# Patient Record
Sex: FEMALE | Race: WHITE
[De-identification: names, ages, dates, MRNs, and addresses within clinical notes are randomized per-mention and may not be internally consistent; named-entity substitution may affect disease eponyms.]

---

## 2017-11-13 ENCOUNTER — HOSPITAL ENCOUNTER (OUTPATIENT)
Dept: HOSPITAL 53 - M RAD | Age: 72
End: 2017-11-13
Attending: OTOLARYNGOLOGY
Payer: MEDICARE

## 2017-11-13 DIAGNOSIS — R49.0: Primary | ICD-10-CM

## 2017-11-13 NOTE — REP
Clinical:  Dysphonia .

 

Comparison: None .

 

Technique:  PA and lateral.

 

Findings:

The mediastinum and cardiac silhouette are normal.  The lung fields demonstrate

chronic-appearing interstitial changes without acute consolidation, effusion, or

pneumothorax.  The skeletal structures are intact and normal.

 

Impression:

1.   No acute cardiopulmonary process.

 

 

Signed by

Leon Alvares MD 11/13/2017 01:01 P

## 2018-07-19 ENCOUNTER — HOSPITAL ENCOUNTER (OUTPATIENT)
Dept: HOSPITAL 53 - M ST | Age: 73
LOS: 12 days | End: 2018-07-31
Attending: OTOLARYNGOLOGY
Payer: MEDICARE

## 2018-07-19 DIAGNOSIS — R49.0: ICD-10-CM

## 2018-07-19 DIAGNOSIS — Z51.89: Primary | ICD-10-CM

## 2018-07-19 PROCEDURE — 92507 TX SP LANG VOICE COMM INDIV: CPT

## 2018-08-02 ENCOUNTER — HOSPITAL ENCOUNTER (OUTPATIENT)
Dept: HOSPITAL 53 - M ST | Age: 73
LOS: 29 days | End: 2018-08-31
Attending: OTOLARYNGOLOGY
Payer: MEDICARE

## 2018-08-02 DIAGNOSIS — R49.0: Primary | ICD-10-CM

## 2018-08-02 PROCEDURE — 92507 TX SP LANG VOICE COMM INDIV: CPT

## 2018-09-04 ENCOUNTER — HOSPITAL ENCOUNTER (OUTPATIENT)
Dept: HOSPITAL 53 - M ST | Age: 73
LOS: 26 days | End: 2018-09-30
Attending: OTOLARYNGOLOGY
Payer: MEDICARE

## 2018-09-04 DIAGNOSIS — R49.0: Primary | ICD-10-CM

## 2018-09-04 PROCEDURE — 92507 TX SP LANG VOICE COMM INDIV: CPT

## 2022-02-04 ENCOUNTER — HOSPITAL ENCOUNTER (OUTPATIENT)
Dept: HOSPITAL 53 - M PLALAB | Age: 77
End: 2022-02-04
Attending: STUDENT IN AN ORGANIZED HEALTH CARE EDUCATION/TRAINING PROGRAM
Payer: MEDICARE

## 2022-02-04 DIAGNOSIS — J84.9: Primary | ICD-10-CM

## 2022-02-04 LAB
CANCER AG19-9 SERPL-ACNC: 16.4 U/ML (ref ?–35)
CRP SERPL-MCNC: < 0.3 MG/DL (ref 0–0.3)
RHEUMATOID FACT SERPL-ACNC: < 10 IU/ML (ref ?–15)

## 2022-02-09 LAB
ACE SERPL-CCNC: 35 U/L (ref 14–82)
C-ANCA TITR SER IF: (no result) TITER
CCP IGG SERPL-ACNC: 4 UNITS (ref 0–19)
ENA SS-A AB SER-ACNC: <0.2 AI (ref 0–0.9)
ENA SS-B AB SER-ACNC: <0.2 AI (ref 0–0.9)
P-ANCA ATYPICAL TITR SER IF: (no result) TITER
P-ANCA TITR SER IF: (no result) TITER

## 2022-02-11 LAB
ENA JO1 AB SER-ACNC: <20 UNITS (ref ?–20)
ENA RNP AB SER-ACNC: < 0.2 AI (ref 0–0.9)
ENA SM AB SER-ACNC: < 0.2 AI (ref 0–0.9)

## 2022-02-25 ENCOUNTER — HOSPITAL ENCOUNTER (OUTPATIENT)
Dept: HOSPITAL 53 - M RAD | Age: 77
End: 2022-02-25
Attending: STUDENT IN AN ORGANIZED HEALTH CARE EDUCATION/TRAINING PROGRAM
Payer: MEDICARE

## 2022-02-25 DIAGNOSIS — J84.10: Primary | ICD-10-CM

## 2022-06-17 ENCOUNTER — HOSPITAL ENCOUNTER (OUTPATIENT)
Dept: HOSPITAL 53 - M PLALAB | Age: 77
End: 2022-06-17
Attending: STUDENT IN AN ORGANIZED HEALTH CARE EDUCATION/TRAINING PROGRAM
Payer: MEDICARE

## 2022-06-17 DIAGNOSIS — J84.9: Primary | ICD-10-CM

## 2022-06-17 LAB
ALBUMIN SERPL BCG-MCNC: 3.6 GM/DL (ref 3.2–5.2)
ALT SERPL W P-5'-P-CCNC: 23 U/L (ref 12–78)
BILIRUB CONJ SERPL-MCNC: 0.1 MG/DL (ref 0–0.2)
BILIRUB SERPL-MCNC: 0.5 MG/DL (ref 0.2–1)
PROT SERPL-MCNC: 7.9 GM/DL (ref 6.4–8.2)